# Patient Record
Sex: FEMALE | Race: WHITE
[De-identification: names, ages, dates, MRNs, and addresses within clinical notes are randomized per-mention and may not be internally consistent; named-entity substitution may affect disease eponyms.]

---

## 2017-06-12 ENCOUNTER — HOSPITAL ENCOUNTER (EMERGENCY)
Dept: HOSPITAL 31 - C.ER | Age: 32
Discharge: ANOTHER HOSPITAL | End: 2017-06-12
Payer: COMMERCIAL

## 2017-06-12 VITALS
HEART RATE: 90 BPM | SYSTOLIC BLOOD PRESSURE: 135 MMHG | TEMPERATURE: 99.4 F | OXYGEN SATURATION: 98 % | DIASTOLIC BLOOD PRESSURE: 91 MMHG

## 2017-06-12 VITALS — RESPIRATION RATE: 20 BRPM

## 2017-06-12 DIAGNOSIS — J40: Primary | ICD-10-CM

## 2017-06-12 NOTE — C.PDOC
History Of Present Illness


31 y/o female with PMHx of asthma presents to ED for evaluation of URI symptoms 

associated with nasal congestion, sore throat and dry cough for 4 days. Patient 

was seen by PMD and was given Azithromycin with no improvement. Patient reports

, " my chest in on fire", occasional dry cough noted in ED. Patient denies high 

fever, chills, dizziness, headache, drooling, neck pain, dysphagia, dyspnea, 

wheezing, abd. pain, n/v/d, denies recent travel or sick contact. Ambulate to 

Ed for evaluation, not in any apparent distress.


Time Seen by Provider: 06/12/17 21:21


Chief Complaint (Nursing): Flu-like Symptoms


History Per: Patient


History/Exam Limitations: no limitations


Onset/Duration Of Symptoms: Days


Current Symptoms Are (Timing): Still Present


Associated Symptoms: Cough, Nasal Congestion





Past Medical History


Reviewed: Historical Data, Nursing Documentation, Vital Signs


Vital Signs: 


 Last Vital Signs











Temp  99.4 F   06/12/17 21:12


 


Pulse  90   06/12/17 21:12


 


Resp  16   06/12/17 21:12


 


BP  135/91 H  06/12/17 21:12


 


Pulse Ox  98   06/12/17 21:50














- Medical History


PMH: Asthma


Surgical History: Cholecystectomy


Family History: States: Unknown Family Hx





- Social History


Hx Tobacco Use: Yes


Hx Alcohol Use: Yes


Hx Substance Use: No





- Immunization History


Hx Tetanus Toxoid Vaccination: No


Hx Influenza Vaccination: No


Hx Pneumococcal Vaccination: No





Review Of Systems


Except As Marked, All Systems Reviewed And Found Negative.


Constitutional: Negative for: Fever, Chills


ENT: Positive for: Nose Congestion


Cardiovascular: Negative for: Chest Pain


Respiratory: Positive for: Cough


Gastrointestinal: Negative for: Nausea, Vomiting, Diarrhea


Skin: Negative for: Rash


Neurological: Negative for: Weakness, Headache, Dizziness





Physical Exam





- Physical Exam


Appears: Non-toxic, No Acute Distress


Skin: Normal Color, Warm, No Rash


Ear(s): Bilateral: Normal


Nose: Discharge (B/L nasal congestion with scant clear rhinorhea)


Oral Mucosa: Moist, No Drooling


Throat: Normal, No Erythema, No Exudate, No Drooling


Neck: Supple


Chest: Symmetrical


Cardiovascular: Rhythm Regular, No Murmur


Respiratory: No Decreased Breath Sounds, No Accessory Muscle Use, No Rales, No 

Rhonchi, Wheezing (Scattered right base wheezing)


Gastrointestinal/Abdominal: Soft, No Tenderness, No Guarding, No Rebound


Extremity: No Pedal Edema


Neurological/Psych: Oriented x3, Normal Speech, Normal Cognition





ED Course And Treatment


O2 Sat by Pulse Oximetry: 98 (RA)


Pulse Ox Interpretation: Normal





- Radiology


CXR: Interpreted by Me, Viewed By Me


CXR Interpretation: Yes: No Acute Disease


Progress Note: On re-evaluation, pt is afebrile, hemodynamicaly stable.  Non-

toxic. Tolerate Po well in ED.  PulseOx 98% RA.  neck: (-) meningeal sign.  ENT

: no acute findings.  Lungs: CTA B/L, BS equal B/L.  ABd: benign.  CXR- normal 

study.  Pt has clinical findings c/w bronchitis, hx of asthma.  Pt advised on 

course of ds, counceled on smoking cessation.  refl. to f/u with PMD in 2-3 

days for re-eval.  reutrn to ED if any worsening or new changes.





Disposition


Counseled Patient/Family Regarding: Studies Performed, Diagnosis, Need For 

Followup, Rx Given





- Disposition


Referrals: 


Dayanara Tolentino MD [Staff Provider] - 


Disposition: HOSPITALIZED


Disposition Time: 22:22


Condition: STABLE


Additional Instructions: 


Encourage fluids





Continue antibiotic as initiated by PMD





Take medication that received today as prescribed





Follow up with PMD in 2-3 days for re-evaluation.


Return to ED if any worsening or new changes.


Prescriptions: 


Albuterol HFA [Ventolin HFA 90 mcg/actuation (8 g)] 1 puff IH Q6 #1 inhaler


Prednisone [Deltasone] 40 mg PO DAILY #6 tablet


Promethazine/Codeine [Phenergan/Codeine Oral Syrup] 5 ml PO Q6 #60 ml


Instructions:  Acute Bronchitis (ED), How to Stop Smoking (ED)





- Clinical Impression


Clinical Impression: 


 Bronchitis








- PA / NP / Resident Statement


MD/DO has reviewed & agrees with the documentation as recorded.





- Scribe Statement


The provider has reviewed the documentation as recorded by the Yeni Blanchard





All medical record entries made by the Scriberika were at my direction and 

personally dictated by me. I have reviewed the chart and agree that the record 

accurately reflects my personal performance of the history, physical exam, 

medical decision making, and the department course for this patient. I have 

also personally directed, reviewed, and agree with the discharge instructions 

and disposition.

## 2017-06-13 NOTE — RAD
HISTORY:

Cough  



COMPARISON:

Chest x-ray performed 8/10/15 



TECHNIQUE:

Chest PA and lateral



FINDINGS:





LUNGS:

No focal consolidation.



Please note that chest x-ray has limited sensitivity for the 

detection of pulmonary masses.



PLEURA:

No significant pleural effusion identified. No definite pneumothorax .



CARDIOVASCULAR:

The cardiomediastinal silhouette appears within normal limits of 

size. 



OSSEOUS STRUCTURES:

 No acute osseous abnormality identified.



VISUALIZED UPPER ABDOMEN:

Unremarkable.



OTHER FINDINGS:

None.



IMPRESSION:

No focal consolidation, significant pleural effusion, or definite 

pneumothorax identified.

## 2018-10-03 ENCOUNTER — HOSPITAL ENCOUNTER (EMERGENCY)
Dept: HOSPITAL 31 - C.ER | Age: 33
Discharge: LEFT BEFORE DISCHARGE | End: 2018-10-03
Payer: COMMERCIAL

## 2018-10-03 VITALS
HEART RATE: 86 BPM | TEMPERATURE: 99 F | OXYGEN SATURATION: 95 % | RESPIRATION RATE: 16 BRPM | SYSTOLIC BLOOD PRESSURE: 147 MMHG | DIASTOLIC BLOOD PRESSURE: 93 MMHG

## 2018-10-03 VITALS — BODY MASS INDEX: 31.2 KG/M2

## 2018-10-03 DIAGNOSIS — R10.9: ICD-10-CM

## 2018-10-03 DIAGNOSIS — Z02.89: Primary | ICD-10-CM
